# Patient Record
Sex: FEMALE | Race: WHITE | NOT HISPANIC OR LATINO | Employment: OTHER | URBAN - METROPOLITAN AREA
[De-identification: names, ages, dates, MRNs, and addresses within clinical notes are randomized per-mention and may not be internally consistent; named-entity substitution may affect disease eponyms.]

---

## 2022-12-25 ENCOUNTER — HOSPITAL ENCOUNTER (EMERGENCY)
Facility: HOSPITAL | Age: 72
Discharge: HOME | End: 2022-12-25
Attending: EMERGENCY MEDICINE

## 2022-12-25 VITALS
DIASTOLIC BLOOD PRESSURE: 89 MMHG | RESPIRATION RATE: 18 BRPM | BODY MASS INDEX: 30.53 KG/M2 | TEMPERATURE: 98.8 F | HEART RATE: 80 BPM | HEIGHT: 66 IN | OXYGEN SATURATION: 98 % | SYSTOLIC BLOOD PRESSURE: 150 MMHG | WEIGHT: 190 LBS

## 2022-12-25 DIAGNOSIS — H16.009 CORNEAL ULCER: Primary | ICD-10-CM

## 2022-12-25 DIAGNOSIS — H10.9 CONJUNCTIVITIS: ICD-10-CM

## 2022-12-25 RX ORDER — OFLOXACIN 3 MG/ML
1 SOLUTION/ DROPS OPHTHALMIC
Qty: 5 ML | Refills: 0 | Status: SHIPPED | OUTPATIENT
Start: 2022-12-25

## 2022-12-25 RX ORDER — TETRACAINE HYDROCHLORIDE 5 MG/ML
2 SOLUTION OPHTHALMIC ONCE
Status: COMPLETED | OUTPATIENT
Start: 2022-12-25 | End: 2022-12-25

## 2022-12-25 RX ORDER — OFLOXACIN 3 MG/ML
1 SOLUTION/ DROPS OPHTHALMIC ONCE
Status: COMPLETED | OUTPATIENT
Start: 2022-12-25 | End: 2022-12-25

## 2022-12-25 RX ADMIN — OFLOXACIN 1 DROP: 3 SOLUTION OPHTHALMIC at 18:55

## 2022-12-25 RX ADMIN — TETRACAINE HYDROCHLORIDE 2 DROP: 5 SOLUTION OPHTHALMIC at 18:02

## 2022-12-25 RX ADMIN — FLUORESCEIN SODIUM 1 STRIP: 1 STRIP OPHTHALMIC at 18:02

## 2022-12-26 NOTE — ED PROVIDER NOTES
History  Chief Complaint   Patient presents with   • Blurred Vision     Patient presents to the ER with worsening vision x 3 days  Patient has her eyes closed in triage and cannot open them  Patients' eye orbits are all reddened  with reddening on her forehead as well  67year old female presents for evaluation of b/l eye pain and irritation that started a few days ago  Patient reports they were irritated but still able to open eyes and see until a few hours ago  Has been trying to wipe away the pus but painful to do so  No pain with eye movement  Denies having similar symptoms previously  Patient does not wear contact lenses  None       Past Medical History:   Diagnosis Date   • CHF (congestive heart failure) (HCC)    • Disease of thyroid gland    • Hyperlipidemia    • Hypertension    • Memory loss        Past Surgical History:   Procedure Laterality Date   • CARDIAC DEFIBRILLATOR PLACEMENT      2019       History reviewed  No pertinent family history  I have reviewed and agree with the history as documented  E-Cigarette/Vaping   • E-Cigarette Use Never User      E-Cigarette/Vaping Substances     Social History     Tobacco Use   • Smoking status: Never   • Smokeless tobacco: Never   Vaping Use   • Vaping Use: Never used   Substance Use Topics   • Alcohol use: Never   • Drug use: Never       Review of Systems   Constitutional: Negative for fever  Eyes: Positive for pain, discharge and redness  Negative for visual disturbance  All other systems reviewed and are negative  Physical Exam  Physical Exam  Vitals and nursing note reviewed  Constitutional:       Appearance: She is well-developed  HENT:      Head: Normocephalic and atraumatic  Right Ear: External ear normal       Left Ear: External ear normal       Nose: Nose normal    Eyes:      General: Vision grossly intact  Gaze aligned appropriately  No visual field deficit or scleral icterus       Extraocular Movements: Extraocular movements intact  Conjunctiva/sclera:      Right eye: Right conjunctiva is injected  Left eye: Left conjunctiva is injected  Cardiovascular:      Rate and Rhythm: Normal rate  Pulmonary:      Effort: Pulmonary effort is normal  No respiratory distress  Abdominal:      General: There is no distension  Musculoskeletal:         General: No deformity  Normal range of motion  Cervical back: Normal range of motion  Skin:     Findings: No rash  Neurological:      General: No focal deficit present  Mental Status: She is alert and oriented to person, place, and time  Psychiatric:         Mood and Affect: Mood normal                Vital Signs  ED Triage Vitals   Temperature Pulse Respirations Blood Pressure SpO2   12/25/22 1701 12/25/22 1659 12/25/22 1659 12/25/22 1659 12/25/22 1701   98 8 °F (37 1 °C) 78 18 150/89 98 %      Temp Source Heart Rate Source Patient Position - Orthostatic VS BP Location FiO2 (%)   12/25/22 1701 -- 12/25/22 1701 12/25/22 1701 --   Oral  Sitting Left arm       Pain Score       12/25/22 1701       10 - Worst Possible Pain           Vitals:    12/25/22 1659 12/25/22 1701   BP: 150/89 150/89   Pulse: 78 80   Patient Position - Orthostatic VS:  Sitting         Visual Acuity  Visual Acuity    Flowsheet Row Most Recent Value   Visual acuity R eye is 20/200   Visual acuity in both eyes is 20/20   Wearing corrective eyewear/lenses?  No   No corrective eyewear/lenses Yes          ED Medications  Medications   fluorescein sodium sterile ophthalmic strip 1 strip (1 strip Both Eyes Given 12/25/22 1802)   tetracaine 0 5 % ophthalmic solution 2 drop (2 drops Both Eyes Given 12/25/22 1802)   ofloxacin (OCUFLOX) 0 3 % ophthalmic solution 1 drop (1 drop Both Eyes Given 12/25/22 1855)       Diagnostic Studies  Results Reviewed     None                 No orders to display              Procedures  Procedures         ED Course                                             MDM  Number of Diagnoses or Management Options  Conjunctivitis: new and requires workup  Corneal ulcer: new and requires workup  Diagnosis management comments: 67 yof with b/l conjunctivitis and left corneal ulcer  Significant improvement of symptoms after tetracaine administration  Able to open eyes and keep them open  Vision intact once tetracaine administered  Evidence of corneal ulcer on left  D/w ophtho  ocuflex q30 minutes and follow up outpatient  Patient agreeable with this plan  RTED discussed  Amount and/or Complexity of Data Reviewed  Clinical lab tests: reviewed  Tests in the radiology section of CPT®: reviewed  Tests in the medicine section of CPT®: reviewed and ordered  Decide to obtain previous medical records or to obtain history from someone other than the patient: yes  Review and summarize past medical records: yes        Disposition  Final diagnoses:   Corneal ulcer   Conjunctivitis     Time reflects when diagnosis was documented in both MDM as applicable and the Disposition within this note     Time User Action Codes Description Comment    12/25/2022  6:35 PM Rosendo Raspberry Add [K02 630] Corneal ulcer     12/25/2022  6:35 PM Rosendo Raspberry Add [H10 9] Conjunctivitis       ED Disposition     ED Disposition   Discharge    Condition   Stable    Date/Time   Sun Dec 25, 2022  6:35 PM    Comment   Praveen Montes discharge to home/self care                 Follow-up Information     Follow up With Specialties Details Why Contact Info Additional [address] [facility] Ophthalmology In 1 day  [facility] [address]  [phone]        [address] Emergency Department Emergency Medicine  If symptoms worsen [address] [place] [address]  [address] [facility] Emergency Department, [address] [address]          Discharge Medication List as of 12/25/2022  6:38 PM      START taking these medications    Details   ofloxacin (OCUFLOX) 0 3 % ophthalmic solution Administer 1 drop to both eyes every 30 (thirty) minutes, Starting Sun 12/25/2022, Normal             No discharge procedures on file      PDMP Review     None          ED Provider  Electronically Signed by           Andrez Cai DO  12/25/22 3572 [facility], DO  12/25/22 0823

## 2023-01-15 ENCOUNTER — HOSPITAL ENCOUNTER (EMERGENCY)
Facility: HOSPITAL | Age: 73
Discharge: HOME | End: 2023-01-15
Attending: EMERGENCY MEDICINE

## 2023-01-15 ENCOUNTER — APPOINTMENT (EMERGENCY)
Dept: CT IMAGING | Facility: HOSPITAL | Age: 73
End: 2023-01-15
Attending: EMERGENCY MEDICINE

## 2023-01-15 VITALS
HEIGHT: 66 IN | WEIGHT: 166.23 LBS | TEMPERATURE: 97.4 F | RESPIRATION RATE: 18 BRPM | BODY MASS INDEX: 26.71 KG/M2 | HEART RATE: 76 BPM | DIASTOLIC BLOOD PRESSURE: 76 MMHG | SYSTOLIC BLOOD PRESSURE: 151 MMHG | OXYGEN SATURATION: 97 %

## 2023-01-15 DIAGNOSIS — L25.9 CONTACT DERMATITIS: Primary | ICD-10-CM

## 2023-01-15 DIAGNOSIS — H10.10 ALLERGIC CONJUNCTIVITIS: ICD-10-CM

## 2023-01-15 LAB
ALBUMIN SERPL BCP-MCNC: 3.9 G/DL (ref 3.5–5)
ALP SERPL-CCNC: 70 U/L (ref 46–116)
ALT SERPL W P-5'-P-CCNC: 20 U/L (ref 12–78)
ANION GAP SERPL CALCULATED.3IONS-SCNC: 7 MMOL/L (ref 4–13)
AST SERPL W P-5'-P-CCNC: 16 U/L (ref 5–45)
BASOPHILS # BLD AUTO: 0.05 THOUSANDS/ÂΜL (ref 0–0.1)
BASOPHILS NFR BLD AUTO: 1 % (ref 0–1)
BILIRUB SERPL-MCNC: 0.4 MG/DL (ref 0.2–1)
BILIRUB UR QL STRIP: NEGATIVE
BUN SERPL-MCNC: 15 MG/DL (ref 5–25)
CALCIUM SERPL-MCNC: 8.7 MG/DL (ref 8.3–10.1)
CHLORIDE SERPL-SCNC: 103 MMOL/L (ref 96–108)
CLARITY UR: CLEAR
CO2 SERPL-SCNC: 28 MMOL/L (ref 21–32)
COLOR UR: COLORLESS
CREAT SERPL-MCNC: 1.03 MG/DL (ref 0.6–1.3)
CRP SERPL QL: <3 MG/L
EOSINOPHIL # BLD AUTO: 0.08 THOUSAND/ÂΜL (ref 0–0.61)
EOSINOPHIL NFR BLD AUTO: 2 % (ref 0–6)
ERYTHROCYTE [DISTWIDTH] IN BLOOD BY AUTOMATED COUNT: 13.5 % (ref 11.6–15.1)
ERYTHROCYTE [SEDIMENTATION RATE] IN BLOOD: 21 MM/HOUR (ref 0–29)
GFR SERPL CREATININE-BSD FRML MDRD: 54 ML/MIN/1.73SQ M
GLUCOSE SERPL-MCNC: 108 MG/DL (ref 65–140)
GLUCOSE UR STRIP-MCNC: NEGATIVE MG/DL
HCT VFR BLD AUTO: 41.8 % (ref 34.8–46.1)
HGB BLD-MCNC: 13.2 G/DL (ref 11.5–15.4)
HGB UR QL STRIP.AUTO: NEGATIVE
IMM GRANULOCYTES # BLD AUTO: 0.01 THOUSAND/UL (ref 0–0.2)
IMM GRANULOCYTES NFR BLD AUTO: 0 % (ref 0–2)
KETONES UR STRIP-MCNC: NEGATIVE MG/DL
LACTATE SERPL-SCNC: 0.9 MMOL/L (ref 0.5–2)
LEUKOCYTE ESTERASE UR QL STRIP: NEGATIVE
LYMPHOCYTES # BLD AUTO: 1.59 THOUSANDS/ÂΜL (ref 0.6–4.47)
LYMPHOCYTES NFR BLD AUTO: 31 % (ref 14–44)
MCH RBC QN AUTO: 29.5 PG (ref 26.8–34.3)
MCHC RBC AUTO-ENTMCNC: 31.6 G/DL (ref 31.4–37.4)
MCV RBC AUTO: 93 FL (ref 82–98)
MONOCYTES # BLD AUTO: 0.38 THOUSAND/ÂΜL (ref 0.17–1.22)
MONOCYTES NFR BLD AUTO: 8 % (ref 4–12)
NEUTROPHILS # BLD AUTO: 2.96 THOUSANDS/ÂΜL (ref 1.85–7.62)
NEUTS SEG NFR BLD AUTO: 58 % (ref 43–75)
NITRITE UR QL STRIP: NEGATIVE
NRBC BLD AUTO-RTO: 0 /100 WBCS
PH UR STRIP.AUTO: 7.5 [PH]
PLATELET # BLD AUTO: 197 THOUSANDS/UL (ref 149–390)
PMV BLD AUTO: 12.2 FL (ref 8.9–12.7)
POTASSIUM SERPL-SCNC: 3.7 MMOL/L (ref 3.5–5.3)
PROCALCITONIN SERPL-MCNC: <0.05 NG/ML
PROT SERPL-MCNC: 7.2 G/DL (ref 6.4–8.4)
PROT UR STRIP-MCNC: NEGATIVE MG/DL
RBC # BLD AUTO: 4.48 MILLION/UL (ref 3.81–5.12)
SODIUM SERPL-SCNC: 138 MMOL/L (ref 135–147)
SP GR UR STRIP.AUTO: <1.005 (ref 1–1.03)
UROBILINOGEN UR STRIP-ACNC: <2 MG/DL
WBC # BLD AUTO: 5.07 THOUSAND/UL (ref 4.31–10.16)

## 2023-01-15 RX ORDER — PREDNISONE 10 MG/1
TABLET ORAL
Qty: 30 TABLET | Refills: 0 | Status: SHIPPED | OUTPATIENT
Start: 2023-01-16

## 2023-01-15 RX ORDER — PREDNISONE 10 MG/1
TABLET ORAL
Qty: 30 TABLET | Refills: 0 | Status: SHIPPED | OUTPATIENT
Start: 2023-01-16 | End: 2023-01-15 | Stop reason: SDUPTHER

## 2023-01-15 RX ORDER — TRIAMCINOLONE ACETONIDE 1 MG/G
CREAM TOPICAL 2 TIMES DAILY
Qty: 30 G | Refills: 0 | Status: SHIPPED | OUTPATIENT
Start: 2023-01-15 | End: 2023-01-15 | Stop reason: SDUPTHER

## 2023-01-15 RX ORDER — TETRACAINE HYDROCHLORIDE 5 MG/ML
2 SOLUTION OPHTHALMIC ONCE
Status: COMPLETED | OUTPATIENT
Start: 2023-01-15 | End: 2023-01-15

## 2023-01-15 RX ORDER — TRIAMCINOLONE ACETONIDE 1 MG/G
CREAM TOPICAL 2 TIMES DAILY
Qty: 30 G | Refills: 0 | Status: SHIPPED | OUTPATIENT
Start: 2023-01-15 | End: 2023-01-20

## 2023-01-15 RX ADMIN — PREDNISONE 50 MG: 20 TABLET ORAL at 15:30

## 2023-01-15 RX ADMIN — FLUORESCEIN SODIUM 1 STRIP: 1 STRIP OPHTHALMIC at 12:22

## 2023-01-15 RX ADMIN — TETRACAINE HYDROCHLORIDE 2 DROP: 5 SOLUTION OPHTHALMIC at 12:22

## 2023-01-15 RX ADMIN — GLYCERIN, HYPROMELLOSE, POLYETHYLENE GLYCOL 1 DROP: .2; .2; 1 LIQUID OPHTHALMIC at 15:38

## 2023-01-15 RX ADMIN — IOHEXOL 100 ML: 350 INJECTION, SOLUTION INTRAVENOUS at 13:33

## 2023-01-15 NOTE — DISCHARGE INSTRUCTIONS
Put 1 drop of artificial tears in both eyes every 4 hours as needed for 1 week  Must follow-up with ophthalmology and dermatology    May use steroid cream to face twice a day for 5 days

## 2023-01-15 NOTE — ED PROVIDER NOTES
History  Chief Complaint   Patient presents with   • Eye Pain     Patient complaint of eye pain , facial pain and redness , seen here in er 12/25 , symptoms unchanged      This is a 76-year-old female presents with facial rash and bilateral eye pain and burning over the past 3 to 4 weeks she was seen here and given antibacterial eyedrops for conjunctivitis and corneal ulceration  Patient states that she has not noticed any significant improvement and she did not follow-up with the ophthalmologist   Does not have eye pain with medial and lateral gaze  Denies prior episodes of similar rash and presentation  Denies any joint pains or known arthritis  She does not wear contacts or glasses  Patient denies putting any creams or ointments on her face  History provided by:  Patient and spouse  Medical Problem  Location:  Bilateral eye and face  Quality:  Pain and redness  Severity:  Moderate  Onset quality:  Gradual  Duration:  3 weeks  Timing:  Constant  Progression:  Waxing and waning  Chronicity:  New  Context:  Bilateral eye pain and burning with facial rash  Ineffective treatments:  Antibiotic eyedrops  Associated symptoms: rash        Prior to Admission Medications   Prescriptions Last Dose Informant Patient Reported? Taking?   ofloxacin (OCUFLOX) 0 3 % ophthalmic solution   No No   Sig: Administer 1 drop to both eyes every 30 (thirty) minutes      Facility-Administered Medications: None       Past Medical History:   Diagnosis Date   • CHF (congestive heart failure) (HCC)    • Disease of thyroid gland    • Hyperlipidemia    • Hypertension    • Memory loss        Past Surgical History:   Procedure Laterality Date   • CARDIAC DEFIBRILLATOR PLACEMENT      2019       History reviewed  No pertinent family history  I have reviewed and agree with the history as documented      E-Cigarette/Vaping   • E-Cigarette Use Never User      E-Cigarette/Vaping Substances     Social History     Tobacco Use   • Smoking status: Never   • Smokeless tobacco: Never   Vaping Use   • Vaping Use: Never used   Substance Use Topics   • Alcohol use: Never   • Drug use: Never       Review of Systems   Eyes: Positive for photophobia  Negative for discharge  Skin: Positive for rash  All other systems reviewed and are negative  Physical Exam  Physical Exam  Vitals and nursing note reviewed  Constitutional:       General: She is not in acute distress  Appearance: She is not ill-appearing, toxic-appearing or diaphoretic  HENT:      Head: Normocephalic and atraumatic  Right Ear: Tympanic membrane, ear canal and external ear normal       Left Ear: Tympanic membrane, ear canal and external ear normal       Nose: Nose normal    Eyes:      General: No scleral icterus  Right eye: No discharge  Left eye: No discharge  Extraocular Movements: Extraocular movements intact  Pupils: Pupils are equal, round, and reactive to light  Comments: Scleral injection bilateral without exudate   Cardiovascular:      Rate and Rhythm: Normal rate and regular rhythm  Pulses: Normal pulses  Heart sounds: No murmur heard  No friction rub  No gallop  Pulmonary:      Effort: Pulmonary effort is normal  No respiratory distress  Breath sounds: No stridor  No wheezing, rhonchi or rales  Abdominal:      General: There is no distension  Palpations: Abdomen is soft  Tenderness: There is no abdominal tenderness  There is no guarding or rebound  Musculoskeletal:         General: No swelling, tenderness or signs of injury  Cervical back: Normal range of motion and neck supple  No rigidity or tenderness  Right lower leg: No edema  Left lower leg: No edema  Skin:     General: Skin is warm and dry  Coloration: Skin is not jaundiced  Findings: Bruising, erythema and rash present  Comments: Dry erythematous rash to the face slightly on dorsal aspect of left hand    Contusion to the right flank area patient denies any known injury   Neurological:      General: No focal deficit present  Mental Status: She is alert and oriented to person, place, and time  Cranial Nerves: No cranial nerve deficit  Sensory: No sensory deficit  Coordination: Coordination normal    Psychiatric:         Mood and Affect: Mood normal          Behavior: Behavior normal          Thought Content:  Thought content normal          Vital Signs  ED Triage Vitals [01/15/23 1122]   Temperature Pulse Respirations Blood Pressure SpO2   (!) 97 4 °F (36 3 °C) 76 18 151/76 97 %      Temp src Heart Rate Source Patient Position - Orthostatic VS BP Location FiO2 (%)   -- -- -- -- --      Pain Score       --           Vitals:    01/15/23 1122   BP: 151/76   Pulse: 76         Visual Acuity  Visual Acuity    Flowsheet Row Most Recent Value   Visual acuity R eye is 20/50   Visual acuity Left eye is 20/40   Visual acuity in both eyes is 20/30          ED Medications  Medications   glycerin-hypromellose- (ARTIFICIAL TEARS) ophthalmic solution 1 drop (has no administration in time range)   fluorescein sodium sterile ophthalmic strip 1 strip (1 strip Both Eyes Given 1/15/23 1222)   tetracaine 0 5 % ophthalmic solution 2 drop (2 drops Both Eyes Given 1/15/23 1222)   iohexol (OMNIPAQUE) 350 MG/ML injection (SINGLE-DOSE) 100 mL (100 mL Intravenous Given 1/15/23 1333)   predniSONE tablet 50 mg (50 mg Oral Given 1/15/23 1530)       Diagnostic Studies  Results Reviewed     Procedure Component Value Units Date/Time    UA w Reflex to Microscopic w Reflex to Culture [[id number]]  (Abnormal) Collected: 01/15/23 1433    Lab Status: Final result Specimen: Urine, Clean Catch Updated: 01/15/23 1458     Color, UA Colorless     Clarity, UA Clear     Specific Gravity, UA <1 005     pH, UA 7 5     Leukocytes, UA Negative     Nitrite, UA Negative     Protein, UA Negative mg/dl      Glucose, UA Negative mg/dl      Ketones, UA Negative mg/dl      Urobilinogen, UA <2 0 mg/dl      Bilirubin, UA Negative     Occult Blood, UA Negative    Body fluid culture and Gram stain [[id number]] Collected: 01/15/23 1433    Lab Status: In process Specimen: Body Fluid from Other Updated: 01/15/23 1453    [place] Screen w/ Reflex to Titer/Pattern [[id number]] Collected: 01/15/23 1352    Lab Status:  In process Specimen: Blood from Arm, Left Updated: 01/15/23 1401    Comprehensive metabolic panel [[id number]] Collected: 01/15/23 1231    Lab Status: Final result Specimen: Blood from Arm, Left Updated: 01/15/23 1316     Sodium 138 mmol/L      Potassium 3 7 mmol/L      Chloride 103 mmol/L      CO2 28 mmol/L      ANION GAP 7 mmol/L      BUN 15 mg/dL      Creatinine 1 03 mg/dL      Glucose 108 mg/dL      Calcium 8 7 mg/dL      AST 16 U/L      ALT 20 U/L      Alkaline Phosphatase 70 U/L      Total Protein 7 2 g/dL      Albumin 3 9 g/dL      Total Bilirubin 0 40 mg/dL      eGFR 54 ml/min/1 73sq m     Narrative:      [facility] guidelines for Chronic Kidney Disease (CKD):   •  Stage 1 with normal or high GFR (GFR > 90 mL/min/1 73 square meters)  •  Stage 2 Mild CKD (GFR = 60-89 mL/min/1 73 square meters)  •  Stage 3A Moderate CKD (GFR = 45-59 mL/min/1 73 square meters)  •  Stage 3B Moderate CKD (GFR = 30-44 mL/min/1 73 square meters)  •  Stage 4 Severe CKD (GFR = 15-29 mL/min/1 73 square meters)  •  Stage 5 End Stage CKD (GFR <15 mL/min/1 73 square meters)  Note: GFR calculation is accurate only with a steady state creatinine    C-reactive protein [[id number]]  (Normal) Collected: 01/15/23 1231    Lab Status: Final result Specimen: Blood from Arm, Left Updated: 01/15/23 1316     CRP <3 0 mg/L     Sedimentation rate, automated [[id number]]  (Normal) Collected: 01/15/23 1231    Lab Status: Final result Specimen: Blood from Arm, Left Updated: 01/15/23 1314     Sed Rate 21 mm/hour     Procalcitonin [[id number]]  (Normal) Collected: 01/15/23 1231    Lab Status: Final result Specimen: Blood from Arm, Left Updated: 01/15/23 1309     Procalcitonin <0 05 ng/ml     Lactic acid [[id number]]  (Normal) Collected: 01/15/23 1231    Lab Status: Final result Specimen: Blood from Arm, Left Updated: 01/15/23 1302     LACTIC ACID 0 9 mmol/L     Narrative:      Result may be elevated if tourniquet was used during collection  CBC and differential [[id number]] Collected: 01/15/23 1231    Lab Status: Final result Specimen: Blood from Arm, Left Updated: 01/15/23 1243     WBC 5 07 Thousand/uL      RBC 4 48 Million/uL      Hemoglobin 13 2 g/dL      Hematocrit 41 8 %      MCV 93 fL      MCH 29 5 pg      MCHC 31 6 g/dL      RDW 13 5 %      MPV 12 2 fL      Platelets 273 Thousands/uL      nRBC 0 /100 WBCs      Neutrophils Relative 58 %      Immat GRANS % 0 %      Lymphocytes Relative 31 %      Monocytes Relative 8 %      Eosinophils Relative 2 %      Basophils Relative 1 %      Neutrophils Absolute 2 96 Thousands/µL      Immature Grans Absolute 0 01 Thousand/uL      Lymphocytes Absolute 1 59 Thousands/µL      Monocytes Absolute 0 38 Thousand/µL      Eosinophils Absolute 0 08 Thousand/µL      Basophils Absolute 0 05 Thousands/µL     Blood culture #2 [[id number]] Collected: 01/15/23 1231    Lab Status: In process Specimen: Blood from Arm, Left Updated: 01/15/23 1240    Blood culture #1 [[id number]] Collected: 01/15/23 1231    Lab Status: In process Specimen: Blood from Hand, Left Updated: 01/15/23 1240                 CT orbits/temporal bones/skull base w contrast   Final Result by Brennan 6, DO (01/15 1458)      No evidence of orbital cellulitis  Mucous retention cyst right maxillary sinus  Workstation performed: [id number]                    Procedures  Procedures         ED Course  ED Course as of 01/15/23 1531   Sun Beto 15, 2023   1303 No significant change with tetracaine eyedrops    There is scleral injection bilateral without any significant exudate fluorescein stain does not show any corneal abrasions or ulcerations  No hyphema or hypopyon  No foreign body or proptosis   1410 Visual acuity was 20/50 in the left eye 20/40 on the right eye and 20/30 bilateral   Intraocular pressure on the left eye was 10 she was not able to tolerate the procedure for the right eye  Culture was taken from the bilateral subconjunctival sacs  80 Appreciate consult from dermatology who recommended culture of the eye and measurement of intraocular ocular pressure consider topical steroids for contact or irritant dermatitis   1424 Ophthalmology recommended artificial tears                               SBIRT 20yo+    Flowsheet Row Most Recent Value   SBIRT (23 yo +)    In order to provide better care to our patients, we are screening all of our patients for alcohol and drug use  Would it be okay to ask you these screening questions? Yes Filed at: 01/15/2023 1139   Initial Alcohol Screen: US AUDIT-C     1  How often do you have a drink containing alcohol? 0 Filed at: 01/15/2023 1139   2  How many drinks containing alcohol do you have on a typical day you are drinking? 0 Filed at: 01/15/2023 1139   3a  Male UNDER 65: How often do you have five or more drinks on one occasion? 0 Filed at: 01/15/2023 1139   3b  FEMALE Any Age, or MALE 65+: How often do you have 4 or more drinks on one occassion? 0 Filed at: 01/15/2023 1139   Audit-C Score 0 Filed at: 01/15/2023 1139   EDUAR: How many times in the past year have you    Used an illegal drug or used a prescription medication for non-medical reasons? Never Filed at: 01/15/2023 1139                    Medical Decision Making  Eye pain and facial rash differential includes cellulitis versus allergy or inflammatory such as lupus eczema or psoriasis work-up in progress we will check labs and CAT scan for further evaluation in addition to fluorescein stain and visual acuity    Amount and/or Complexity of Data Reviewed  Labs: ordered    Radiology: ordered  Risk  Prescription drug management  Disposition  Final diagnoses:   Contact dermatitis   Allergic conjunctivitis     Time reflects when diagnosis was documented in both MDM as applicable and the Disposition within this note     Time User Action Codes Description Comment    1/15/2023  3:23 PM Jayant Chavez Add [L25 9] Contact dermatitis     1/15/2023  3:23 PM Jayant Chavez Add [H10 10] Allergic conjunctivitis       ED Disposition     ED Disposition   Discharge    Condition   Stable    Date/Time   Sun Beto 15, 2023  3:23 PM    [address] discharge to home/self care  Follow-up Information     Follow up With Specialties Details Why Contact Info Additional [address]    [address] [place] [address] MEDICAL Ophthalmology In 1 week  [address]  [facility]  [phone]       [facility] Dermatology In 1 week  [address] [facility], [address] [place], [place], [address] [facility]          Current Discharge Medication List      START taking these medications    Details   predniSONE 10 mg tablet 5 pills for 2 days, 4 pills x 2 days, 3 pills x 2 days, 2 pills x 2 days, 1 pill x 2 days, Do not start before January 16, 2023  Qty: 30 tablet, Refills: 0    Associated Diagnoses: Allergic conjunctivitis      triamcinolone (KENALOG) 0 1 % cream Apply topically 2 (two) times a day for 5 days Apply small amount to the face twice a day for 5 days  Qty: 30 g, Refills: 0    Associated Diagnoses:  Allergic conjunctivitis         CONTINUE these medications which have NOT CHANGED    Details   ofloxacin (OCUFLOX) 0 3 % ophthalmic solution Administer 1 drop to both eyes every 30 (thirty) minutes  Qty: 5 mL, Refills: 0    Associated Diagnoses: Corneal ulcer             No discharge procedures on file      PDMP Review     None          ED Provider  Electronically Signed by           Eliseo Hu DO  01/15/23 2011

## 2023-01-15 NOTE — QUICK NOTE
67year old PMH HTN, HLD, CHF, pacemaker presents with a 3-week history of diffuse pacial pain and burning associated with photophobia and burning eye pain  ON 12/12/2022 she was given ofloxacin deye drops but rash and pain have significantly worsened  VSS  CBC, CMP, inflammatory markers normal  On photos reviewed via tigertext, exam significant for scleral injection, facial erythema, and slight erythema of dorsum of left hand      Plan:  Scleral injection associated with facial pain worsening application of ofloxacin eye drops is suggestive of an irritant contact dermatitis, especially given bilateral nature and hemodynamic stability in setting of normal CBC, CMP, and inflammatory markers  --recommend wound culture and gram stain of eyes to rule out infection (swab medial canthus)  --ophthalmology consult to check for intraocular pressure and to rule out IRITIS  --start triamcinolone 0 1% ointment twice daily for maximum 5 days to affected areas then stop

## 2023-01-16 LAB — ANA SER QL IA: NEGATIVE

## 2023-01-17 LAB
BACTERIA SPEC BFLD CULT: NORMAL
GRAM STN SPEC: NORMAL

## 2023-01-20 LAB
BACTERIA BLD CULT: NORMAL
BACTERIA BLD CULT: NORMAL